# Patient Record
Sex: MALE
[De-identification: names, ages, dates, MRNs, and addresses within clinical notes are randomized per-mention and may not be internally consistent; named-entity substitution may affect disease eponyms.]

---

## 2020-05-14 ENCOUNTER — HOSPITAL ENCOUNTER (EMERGENCY)
Age: 25
Discharge: HOME | DRG: 552 | End: 2020-05-14
Payer: COMMERCIAL

## 2020-05-14 DIAGNOSIS — S29.012A: ICD-10-CM
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
## 2020-05-14 DIAGNOSIS — S16.1XXA: Primary | ICD-10-CM

## 2020-05-14 LAB
ALBUMIN SERPL-MCNC: 4.3 G/DL (ref 3.2–5)
ALP SERPL-CCNC: 84 U/L (ref 38–126)
ANION GAP SERPL CALCULATED.3IONS-SCNC: 11 MMOL/L
AST SERPL-CCNC: 33 U/L (ref 17–59)
BASOPHILS NFR BLD AUTO: 0 % (ref 0–3)
BUN SERPL-MCNC: 17 MG/DL (ref 9–20)
BUN/CREAT SERPL: 19
CHLORIDE SERPL-SCNC: 107 MMOL/L (ref 95–108)
CO2 SERPL-SCNC: 24 MMOL/L (ref 22–30)
CREAT SERPL-MCNC: 0.9 MG/DL (ref 0.7–1.3)
EOSINOPHIL NFR BLD AUTO: 0 % (ref 0–8)
ERYTHROCYTE [DISTWIDTH] IN BLOOD BY AUTOMATED COUNT: 13.9 % (ref 11.5–15.5)
HCT VFR BLD AUTO: 42.1 % (ref 39–50)
HGB BLD-MCNC: 13.6 G/DL (ref 14–18)
IMM GRANULOCYTES NFR BLD: 0.4 % (ref 0–5)
LIPASE SERPL-CCNC: 66 U/L (ref 23–300)
LYMPHOCYTES NFR BLD: 13 % (ref 15–41)
MCH RBC QN AUTO: 27.6 PG  CALC (ref 26–32)
MCHC RBC AUTO-ENTMCNC: 32.3 G/DL CAL (ref 32–36)
MCV RBC AUTO: 85.4 FL  CALC (ref 80–100)
MONOCYTES NFR BLD AUTO: 9 % (ref 2–13)
NEUTROPHILS # BLD AUTO: 8.19 THOU/UL (ref 1.82–7.42)
NEUTROPHILS NFR BLD AUTO: 77 % (ref 42–76)
PLATELET # BLD AUTO: 388 THOU/UL (ref 130–400)
POTASSIUM SERPL-SCNC: 4.4 MMOL/L (ref 3.5–5.1)
PROT SERPL-MCNC: 8.1 G/DL (ref 6.3–8.2)
RBC # BLD AUTO: 4.93 MILL/UL (ref 4.7–6.1)
SODIUM SERPL-SCNC: 138 MMOL/L (ref 137–146)

## 2020-05-14 PROCEDURE — L0120 CERV FLEX N/ADJ FOAM PRE OTS: HCPCS

## 2022-01-02 ENCOUNTER — HOSPITAL ENCOUNTER (EMERGENCY)
Dept: HOSPITAL 82 - ED | Age: 27
Discharge: HOME | DRG: 60 | End: 2022-01-02
Payer: COMMERCIAL

## 2022-01-02 VITALS — BODY MASS INDEX: 39.17 KG/M2 | HEIGHT: 75 IN | WEIGHT: 315 LBS

## 2022-01-02 VITALS — SYSTOLIC BLOOD PRESSURE: 123 MMHG | DIASTOLIC BLOOD PRESSURE: 69 MMHG

## 2022-01-02 DIAGNOSIS — G35: Primary | ICD-10-CM

## 2022-01-02 LAB
ALBUMIN SERPL-MCNC: 4.3 G/DL (ref 3.2–5)
ALP SERPL-CCNC: 94 U/L (ref 38–126)
ANION GAP SERPL CALCULATED.3IONS-SCNC: 11 MMOL/L
AST SERPL-CCNC: 24 U/L (ref 17–59)
BASOPHILS NFR BLD AUTO: 0 % (ref 0–3)
BILIRUB UR QL STRIP.AUTO: NEGATIVE
BUN SERPL-MCNC: 18 MG/DL (ref 9–20)
BUN/CREAT SERPL: 18
CHLORIDE SERPL-SCNC: 106 MMOL/L (ref 95–108)
CO2 SERPL-SCNC: 27 MMOL/L (ref 22–30)
COLOR UR AUTO: YELLOW
CREAT SERPL-MCNC: 1 MG/DL (ref 0.7–1.3)
EOSINOPHIL NFR BLD AUTO: 0 % (ref 0–8)
ERYTHROCYTE [DISTWIDTH] IN BLOOD BY AUTOMATED COUNT: 13.6 % (ref 11.5–15.5)
GLUCOSE UR STRIP.AUTO-MCNC: NEGATIVE MG/DL
HCT VFR BLD AUTO: 45.7 % (ref 39–50)
HGB BLD-MCNC: 15 G/DL (ref 14–18)
HGB UR QL STRIP.AUTO: NEGATIVE
IMM GRANULOCYTES NFR BLD: 0.3 % (ref 0–5)
KETONES UR STRIP.AUTO-MCNC: NEGATIVE MG/DL
LEUKOCYTE ESTERASE UR QL STRIP.AUTO: NEGATIVE
LYMPHOCYTES NFR BLD: 11 % (ref 15–41)
MCH RBC QN AUTO: 28.4 PG  CALC (ref 26–32)
MCHC RBC AUTO-ENTMCNC: 32.8 G/DL CAL (ref 32–36)
MCV RBC AUTO: 86.4 FL  CALC (ref 80–100)
MONOCYTES NFR BLD AUTO: 6 % (ref 2–13)
NEUTROPHILS # BLD AUTO: 10.12 THOU/UL (ref 1.82–7.42)
NEUTROPHILS NFR BLD AUTO: 82 % (ref 42–76)
NITRITE UR QL STRIP.AUTO: NEGATIVE
PH UR STRIP.AUTO: 6 [PH] (ref 4.5–8)
PLATELET # BLD AUTO: 350 THOU/UL (ref 130–400)
POTASSIUM SERPL-SCNC: 4 MMOL/L (ref 3.5–5.1)
PROT SERPL-MCNC: 8.4 G/DL (ref 6.3–8.2)
PROT UR QL STRIP.AUTO: NEGATIVE MG/DL
RBC # BLD AUTO: 5.29 MILL/UL (ref 4.7–6.1)
SODIUM SERPL-SCNC: 140 MMOL/L (ref 137–146)
SP GR UR STRIP.AUTO: >=1.03
UROBILINOGEN UR QL STRIP.AUTO: 4 E.U./DL

## 2022-07-06 ENCOUNTER — HOSPITAL ENCOUNTER (OUTPATIENT)
Dept: HOSPITAL 82 - ED | Age: 27
Setting detail: OBSERVATION
LOS: 2 days | Discharge: HOME | DRG: 58 | End: 2022-07-08
Attending: STUDENT IN AN ORGANIZED HEALTH CARE EDUCATION/TRAINING PROGRAM | Admitting: STUDENT IN AN ORGANIZED HEALTH CARE EDUCATION/TRAINING PROGRAM
Payer: COMMERCIAL

## 2022-07-06 VITALS — BODY MASS INDEX: 39.17 KG/M2 | WEIGHT: 315 LBS | HEIGHT: 75 IN

## 2022-07-06 VITALS — DIASTOLIC BLOOD PRESSURE: 90 MMHG | SYSTOLIC BLOOD PRESSURE: 134 MMHG

## 2022-07-06 VITALS — DIASTOLIC BLOOD PRESSURE: 81 MMHG | SYSTOLIC BLOOD PRESSURE: 110 MMHG

## 2022-07-06 VITALS — SYSTOLIC BLOOD PRESSURE: 131 MMHG | DIASTOLIC BLOOD PRESSURE: 84 MMHG

## 2022-07-06 DIAGNOSIS — G35: Primary | ICD-10-CM

## 2022-07-06 DIAGNOSIS — Z91.81: ICD-10-CM

## 2022-07-06 DIAGNOSIS — U07.1: ICD-10-CM

## 2022-07-06 DIAGNOSIS — H53.8: ICD-10-CM

## 2022-07-06 DIAGNOSIS — E66.9: ICD-10-CM

## 2022-07-06 LAB
ALBUMIN SERPL-MCNC: 4.1 G/DL (ref 3.2–5)
ALP SERPL-CCNC: 82 U/L (ref 38–126)
ANION GAP SERPL CALCULATED.3IONS-SCNC: 13 MMOL/L
AST SERPL-CCNC: 44 U/L (ref 17–59)
BASOPHILS NFR BLD AUTO: 0 % (ref 0–3)
BUN SERPL-MCNC: 14 MG/DL (ref 9–20)
BUN/CREAT SERPL: 15
CHLORIDE SERPL-SCNC: 104 MMOL/L (ref 95–108)
CO2 SERPL-SCNC: 23 MMOL/L (ref 22–30)
CREAT SERPL-MCNC: 1 MG/DL (ref 0.7–1.3)
EOSINOPHIL NFR BLD AUTO: 0 % (ref 0–8)
ERYTHROCYTE [DISTWIDTH] IN BLOOD BY AUTOMATED COUNT: 13.5 % (ref 11.5–15.5)
HCT VFR BLD AUTO: 43.2 % (ref 39–50)
HGB BLD-MCNC: 14.3 G/DL (ref 14–18)
IMM GRANULOCYTES NFR BLD: 0.6 % (ref 0–5)
LYMPHOCYTES NFR BLD: 7 % (ref 15–41)
MCH RBC QN AUTO: 28 PG  CALC (ref 26–32)
MCHC RBC AUTO-ENTMCNC: 33.1 G/DL CAL (ref 32–36)
MCV RBC AUTO: 84.5 FL  CALC (ref 80–100)
MONOCYTES NFR BLD AUTO: 17 % (ref 2–13)
MYOGLOBIN SERPL-MCNC: 73 NG/ML (ref 0–121)
NEUTROPHILS # BLD AUTO: 6.29 THOU/UL (ref 1.82–7.42)
NEUTROPHILS NFR BLD AUTO: 75 % (ref 42–76)
PLATELET # BLD AUTO: 315 THOU/UL (ref 130–400)
POTASSIUM SERPL-SCNC: 3.8 MMOL/L (ref 3.5–5.1)
PROT SERPL-MCNC: 7.9 G/DL (ref 6.3–8.2)
RBC # BLD AUTO: 5.11 MILL/UL (ref 4.7–6.1)
SODIUM SERPL-SCNC: 135 MMOL/L (ref 137–146)

## 2022-07-06 PROCEDURE — G0378 HOSPITAL OBSERVATION PER HR: HCPCS

## 2022-07-06 NOTE — NUR
PT IN ROOM ON MONITOR RECEIVING IV FLUIDS, PT POSITIVE FOR COVID-19 AND HAS
BEEN PLACED ON AIRBORNE PRECAUTIONS, AWAITING LAB AND XRAY RESULTS, WILL CONT
TO MONITOR.

## 2022-07-07 VITALS — SYSTOLIC BLOOD PRESSURE: 124 MMHG | DIASTOLIC BLOOD PRESSURE: 68 MMHG

## 2022-07-07 VITALS — DIASTOLIC BLOOD PRESSURE: 78 MMHG | SYSTOLIC BLOOD PRESSURE: 125 MMHG

## 2022-07-07 VITALS — DIASTOLIC BLOOD PRESSURE: 78 MMHG | SYSTOLIC BLOOD PRESSURE: 133 MMHG

## 2022-07-07 VITALS — DIASTOLIC BLOOD PRESSURE: 79 MMHG | SYSTOLIC BLOOD PRESSURE: 125 MMHG

## 2022-07-07 VITALS — DIASTOLIC BLOOD PRESSURE: 76 MMHG | SYSTOLIC BLOOD PRESSURE: 120 MMHG

## 2022-07-07 VITALS — SYSTOLIC BLOOD PRESSURE: 126 MMHG | DIASTOLIC BLOOD PRESSURE: 75 MMHG

## 2022-07-07 LAB
BILIRUB UR QL STRIP.AUTO: NEGATIVE
COLOR UR AUTO: YELLOW
GLUCOSE UR STRIP.AUTO-MCNC: NEGATIVE MG/DL
HGB UR QL STRIP.AUTO: NEGATIVE
KETONES UR STRIP.AUTO-MCNC: (no result) MG/DL
LEUKOCYTE ESTERASE UR QL STRIP.AUTO: NEGATIVE
NITRITE UR QL STRIP.AUTO: NEGATIVE
PH UR STRIP.AUTO: 6 [PH] (ref 4.5–8)
PROT UR QL STRIP.AUTO: NEGATIVE MG/DL
SP GR UR STRIP.AUTO: >=1.03
UROBILINOGEN UR QL STRIP.AUTO: 1 E.U./DL

## 2022-07-07 NOTE — NUR
AWAKE AND ALERT ORIENTED X4 DENIES PAIN OR DISCOMFORT RESPIRATIONS EVEN AND
UNLABORER PO FLUIDS TOLERATED WELLUSED THE URINAL VOIDED 150 CC MATHEW CLEAR
URINE.

## 2022-07-07 NOTE — NUR
PT IN BED AWAKE, PT STATES NO PAIN, NO DISTRESS NOTED, BED ALARM ON AND
IN LOW POSITION,
CALL LIGHT IN REACH

## 2022-07-07 NOTE — NUR
PT RESTIGN IN BED WATCHING TV WITH FAMILY AT BEDSIDE UPON ENTERING ROOM.
STATES NO PAIN. TELE MONITOR IN PLACE, CONTINOUS MONITORING PER ED. IVF
INFUSING PER EMAR. FALL/SAFTEY PERCAUTION IN PLACE. CALL LIGHT WITHIN REACH.

## 2022-07-07 NOTE — NUR
PT RESTING IN BED. STATES NO PAIN BUT WEAKNESS. ASSESSMENT ALLOWED.
PT IS A&O X3. ABLE TO OBEY COMMANDS, CAN TURN WITHOUT ASSISTANCE. TELE MONITOR
IN PLACE, CONTINOUS MONITORING PER ED. IV RAC 20G INFUSING IVF PER EMAR.
BREAHTING EVEN AND UNLABORED. LUNG SOUNDS CLEAR UPPER/LOWER LOBES ANTERIOR AND
POSTERIOR. TEMP:  99, TYNENOL GIVEN SEE EMAR. ENCOURAGED PT USE OF CALL BELL
SYSTEM. FALL/SAFTEY PRECAUTION IN PLACE. CALL LIGHT WITHIN REACH.

## 2022-07-07 NOTE — NUR
ARRIVED VIS STRETCHER ACCOMPANIED BY ER JOHANNA RN AND PATIENT'S MOTHER.
TRANSFERED TO [facility] ORIENTED TO ROOM AND PLAN OFCARE

## 2022-07-07 NOTE — NUR
RECEIVED REPORT FROM RN.  PATIENT RESTING IN BED AT THIS TIME. PATIENT IS ABLE
TO COMMUNICATE NEEDS AND DENIES ANY PAIN OR DISCOMFORT AT THIS TIME.  PATIENT
IS A&O.  BED IN LOWEST POSITION, CALL LIGHT WITHIN REACH.

## 2022-07-07 NOTE — NUR
Admission Note
 
Report Given to:         LUPIS RN
Transported by:           Wheelchair          X Stretcher
 
Transported with:        X Nurse     Transporter   X Patent IV    O2
                         X Cardiac Monitor
 
Location:                 ICU      X MS2
 
 
          
 
         PT ADMITTED TO [facility], TRANSPORTED TO FLOOR VIA STRETCHER, ON TELE,
BY JOHANNA RN, REPORT GIVEN AT BEDSIDE TO LUPIS VALENTE.

## 2022-07-07 NOTE — NUR
WHEN ATTEMPTING TO D/C PATIENT IS SO WEAK FROM FEVER AND MS EXACERBATION THAT
HE COULD NOT STAND EVEN WITH ASSISTANCE, AT THIS TIME PT IS NOT SAFE FOR D/C,
PROVIDER NOTIFIED, CT SCAN ORDERED, WILL DISCUSS COURSE OF ACTION FOLLOWING CT
SCAN RESULTS.

## 2022-07-07 NOTE — NUR
PT EATING LUNCH. STATES NO FEELING OF N/V. PSIN REMAINS 2/10 IN ABD. BOWELS
ACTIVE X4. TENDER TO TOUCH. FALL/SAFTEY PRECAUTION IN PLACE. CALL LIGHT WITHIN
REACH

## 2022-07-08 VITALS — SYSTOLIC BLOOD PRESSURE: 115 MMHG | DIASTOLIC BLOOD PRESSURE: 70 MMHG

## 2022-07-08 VITALS — DIASTOLIC BLOOD PRESSURE: 58 MMHG | SYSTOLIC BLOOD PRESSURE: 106 MMHG

## 2022-07-08 VITALS — SYSTOLIC BLOOD PRESSURE: 114 MMHG | DIASTOLIC BLOOD PRESSURE: 69 MMHG

## 2022-07-08 VITALS — DIASTOLIC BLOOD PRESSURE: 71 MMHG | SYSTOLIC BLOOD PRESSURE: 112 MMHG

## 2022-07-08 LAB
ANION GAP SERPL CALCULATED.3IONS-SCNC: 14 MMOL/L
BUN SERPL-MCNC: 12 MG/DL (ref 9–20)
BUN/CREAT SERPL: 18
CHLORIDE SERPL-SCNC: 110 MMOL/L (ref 95–108)
CO2 SERPL-SCNC: 20 MMOL/L (ref 22–30)
CREAT SERPL-MCNC: 0.7 MG/DL (ref 0.7–1.3)
ERYTHROCYTE [DISTWIDTH] IN BLOOD BY AUTOMATED COUNT: 13 % (ref 11.5–15.5)
HCT VFR BLD AUTO: 39.1 % (ref 39–50)
HGB BLD-MCNC: 13.4 G/DL (ref 14–18)
MAGNESIUM SERPL-MCNC: 2 MG/DL (ref 1.6–2.3)
MCH RBC QN AUTO: 28.6 PG  CALC (ref 26–32)
MCHC RBC AUTO-ENTMCNC: 34.3 G/DL CAL (ref 32–36)
MCV RBC AUTO: 83.5 FL  CALC (ref 80–100)
PLATELET # BLD AUTO: 255 THOU/UL (ref 130–400)
POTASSIUM SERPL-SCNC: 4.5 MMOL/L (ref 3.5–5.1)
RBC # BLD AUTO: 4.68 MILL/UL (ref 4.7–6.1)
SODIUM SERPL-SCNC: 139 MMOL/L (ref 137–146)

## 2022-07-08 NOTE — NUR
Patient presents sitting EOB. He tells me he is back to baseline. He is able
to get up with FWW and tells me he has FWW at home. He has met his inpatient
goals and will follow up as an outpatient

## 2022-07-08 NOTE — NUR
GOT REPORT FROM NIGHT SHIFT NURSE. PATIENT ASSESSED. NO SXS OF DISTRESS.
PATIENT SITTING UP ON SIDE OF BED AND FINISHED BREAKFAST. PATIENT GIVEN
MEDICATION. I ARRANGED BEDSIDE TABLE, CALL LIGHT AND PHONE TO BE WITH IN REACH
OF PATIENT. PATIENT WAS TANGLED WITH IV LINE AND TELE BOX CORDS.SITUATED
PATIENT LINES. ADVISED PATIENT TO CALL IF HE NEEDS ANYTHING. PATIENT
VERBALIZED UNDERSTANDING.